# Patient Record
(demographics unavailable — no encounter records)

---

## 2024-12-04 NOTE — ASSESSMENT
[FreeTextEntry1] : Acne;  stable-  continue topicals   Therapeutic options and their risks and benefits; along with multiple diagnostic possibilities were discussed at length; risks and benefits of further study were discussed;  perleche;  discussed at length; may continue tacrolimus indefinitely as long as condition is controlled;  discussed patch testing at length;  may elect to do now to r/o contact allergy, vs. waiting to see if condition stops responding to treatment;  Continue regular exams;

## 2024-12-04 NOTE — HISTORY OF PRESENT ILLNESS
[de-identified] : f/u for check of perleche;  has been doing well using tacrolimus;  but flares when d/c's medication  also: acne; doing OK on topicals

## 2024-12-09 NOTE — PHYSICAL EXAM
[Normal] : normal rate, regular rhythm, normal S1 and S2 and no murmur heard [Soft] : abdomen soft [No HSM] : no HSM [Normal Bowel Sounds] : normal bowel sounds [de-identified] : Bloated, tenderness to suprapubic and mid abdominal regions

## 2024-12-09 NOTE — HISTORY OF PRESENT ILLNESS
[FreeTextEntry8] : Acute care visit PCP Dr. Jay  Patient reports that 3 nights ago she woke up with very severe suprapubic pain.  She went to the bathroom had trouble urinating.  She was able to urinate after waiting about 30 seconds, which is not normal for her.  She also had a bowel movement.  She had to strain a little but she was not constipated and did not have diarrhea.   She has also been spotting in between her periods.  Currently spotting now  That day she did not eat anything out of the usual.  Had italian sub for lunch and chicken pot pie for dinner.  Now for the past few days she has been having a few seconds struggle starting her urination stream.  And some issues while urinating in terms of pressure behind the stream.  There has still been some suprapubic pressure.  She is spotting.  She also states she had some pings of pain here and there in her flanks  This morning she again woke up out of sleep due to suprapubic pain.  Had trouble urinating and pain lasted about 15 minutes then went away..  Associated symptoms of shaking, cold sweats

## 2024-12-16 NOTE — HISTORY OF PRESENT ILLNESS
[de-identified] : ER follow-up PCP Dr. Jay  In [facility] ER on 12/12/2024 for ongoing suprapubic pain and urinary hesitancy.  She then started having blood when urinating.  UA in ER was positive for nitrate and negative for bacteria.  I am unable to see the urine culture.  She was started on cefuroxime.  Has been taking for 5 days and states that all of her dysuria symptoms are getting better.  She was also seen by her gynecologist due to to right ovarian cyst found on CT abdomen and pelvis at the ER.  She states she had a transvaginal ultrasound in the GYN office and was told it was a cyst and to repeat the ultrasound next month.  She already has an appointment scheduled.  There was also an incidental finding of a 4 mm right lower lobe pulmonary nodule on CT. she has history of smoking in college for 1 year about 12 years ago.  Denies shortness of breath, cough, wheeze

## 2025-06-11 NOTE — ASSESSMENT
[FreeTextEntry1] : Acne;  pt. with documented hx of PCOS, already on OCP Risks and benefits of spironolactone were discussed including hormonal effects, irregular menses. Start spirono 100/d;  OCP will likely blunt any effects on menses continue topicals- clinda/tretinoin-  if does well, may d/c topicals and see if perleche resolves with d/c of tretinoin    Therapeutic options and their risks and benefits; along with multiple diagnostic possibilities were discussed at length; risks and benefits of further study were discussed;  perleche;  discussed at length; may continue tacrolimus indefinitely as long as condition is controlled;  discussed patch testing at length; but doubt ACD- very well controlled on Rx;  Continue regular exams;  recheck both problems at upcoming [facility] in 8/2025

## 2025-06-11 NOTE — HISTORY OF PRESENT ILLNESS
[de-identified] : f/u for check of perleche;  has been doing well using tacrolimus;  but flares when d/c's medication  also: acne; was doing OK on topicals- but now having more severe flares; has been on OCP for PCOS for many years
Improved

## 2025-06-11 NOTE — PHYSICAL EXAM
[Full Body Skin Exam Performed] : performed [FreeTextEntry3] : slight residual erythema;  b/l oral commissures;   acne;  - nodular outbreaks on jawlines, lower face